# Patient Record
Sex: FEMALE | Race: WHITE | NOT HISPANIC OR LATINO | Employment: UNEMPLOYED | URBAN - METROPOLITAN AREA
[De-identification: names, ages, dates, MRNs, and addresses within clinical notes are randomized per-mention and may not be internally consistent; named-entity substitution may affect disease eponyms.]

---

## 2017-01-18 ENCOUNTER — OFFICE VISIT (OUTPATIENT)
Dept: BARIATRICS/WEIGHT MGMT | Facility: CLINIC | Age: 57
End: 2017-01-18

## 2017-01-18 VITALS
OXYGEN SATURATION: 98 % | TEMPERATURE: 97.8 F | HEIGHT: 63 IN | HEART RATE: 91 BPM | DIASTOLIC BLOOD PRESSURE: 77 MMHG | RESPIRATION RATE: 18 BRPM | WEIGHT: 166 LBS | BODY MASS INDEX: 29.41 KG/M2 | SYSTOLIC BLOOD PRESSURE: 110 MMHG

## 2017-01-18 DIAGNOSIS — E03.9 HYPOTHYROIDISM, UNSPECIFIED TYPE: ICD-10-CM

## 2017-01-18 DIAGNOSIS — I10 ESSENTIAL HYPERTENSION: ICD-10-CM

## 2017-01-18 DIAGNOSIS — R10.13 DYSPEPSIA: ICD-10-CM

## 2017-01-18 DIAGNOSIS — R53.83 FATIGUE, UNSPECIFIED TYPE: Primary | ICD-10-CM

## 2017-01-18 DIAGNOSIS — Z98.84 S/P BARIATRIC SURGERY: ICD-10-CM

## 2017-01-18 PROCEDURE — 99214 OFFICE O/P EST MOD 30 MIN: CPT | Performed by: PHYSICIAN ASSISTANT

## 2017-01-18 NOTE — PROGRESS NOTES
[facility]  [address]  [facility] [address]  [phone]        Patient Name: Kirsten Cruz.  : 1960      Date of Visit: 2017      Reason for Visit:  6 months s/p LSG by GDW on 16    HPI:  Kirsten Cruz is a 56 y.o. female s/p LSG by GDW on 16.  Feeling good.  No issues/concerns.  Getting 90g prot/day - still doing 2 shakes/day and making high protein/meat food choices.  Feeling satisfied.  Taking a MVI.  Labs 10/2016 revealed elevated ferritin but were o/w okay.  Exercising 5 days/week - going to a gym.   Down 60 lbs.      Past Medical History   Diagnosis Date   • Anxiety    • Arthritis      KNEES AND LOWER BACK    • Depression    • Dyspepsia    • GERD (gastroesophageal reflux disease)    • Glucose intolerance (impaired glucose tolerance)    • Hypercholesteremia    • Hypertension    • Hypothyroidism      goiter, s/p partial thyroidectom   • Low back pain    • Morbid obesity    • Peripheral edema    • Vitamin D deficiency    • Wears dentures      UPPER PLATE AND PARTIAL ON BOTTOM   • Wears glasses      Past Surgical History   Procedure Laterality Date   • Laparoscopic tubal ligation     • Dilatation and curettage       for hx abnormal pap   • Thyroid lobectomy  2002     partial, for goiter   • Colonoscopy       screening, negative   • Carpal tunnel release Bilateral    • Gastric sleeve laparoscopic N/A 2016     Procedure: GASTRIC SLEEVE LAPAROSCOPIC;  Surgeon: Jermaine Tomlinson MD;  Location:  [facility];  Service:    • Total abdominal hysterectomy with salpingo oophorectomy N/A 2016     Procedure: TOTAL ABDOMINAL HYSTERECTOMY, BILATERAL SALPINGO OOPHORECTOMY;  Surgeon: Fifi Partida MD;  Location:  [facility];  Service:    • Appendectomy open  1970     Outpatient Prescriptions Marked as Taking for the 17 encounter (Office Visit) with JERI Lua   Medication Sig Dispense Refill   •  "cyclobenzaprine (FLEXERIL) 10 MG tablet Take 10 mg by mouth 2 (two) times a day as needed for muscle spasms.     • escitalopram (LEXAPRO) 10 MG tablet Take 10 mg by mouth daily.     • ibuprofen (ADVIL,MOTRIN) 400 MG tablet Take 1 tablet by mouth every 6 (six) hours as needed for mild pain (1-3). 25 tablet 0   • levothyroxine (SYNTHROID, LEVOTHROID) 75 MCG tablet Take 75 mcg by mouth daily.     • Multiple Vitamins-Minerals (MULTIVITAMIN ADULT PO) Take 1 tablet by mouth daily.     • Omeprazole 20 MG tablet delayed-release takes one a day  0     No Known Allergies     Social History     Social History   • Marital status:      Spouse name: N/A   • Number of children: 2   • Years of education: N/A     Occupational History   • un employed      Social History Main Topics   • Smoking status: Former Smoker     Years: 30.00     Types: Cigarettes   • Smokeless tobacco: Never Used      Comment: QUIT 4 YEARS AGO   • Alcohol use No   • Drug use: No   • Sexual activity: Yes     Partners: Male     Birth control/ protection: Post-menopausal      Comment: POSTMENOPAUSAL     Other Topics Concern   • Not on file     Social History Narrative       Visit Vitals   • /77 (BP Location: Left arm, Patient Position: Sitting, Cuff Size: Large Adult)   • Pulse 91   • Temp 97.8 °F (36.6 °C) (Temporal Artery )   • Resp 18   • Ht 63\" (160 cm)   • Wt 166 lb (75.3 kg)   • LMP  (LMP Unknown)   • SpO2 98%   • BMI 29.41 kg/m2       Physical Exam   Constitutional: She appears well-developed and well-nourished. She is cooperative.   HENT:   Mouth/Throat: Oropharynx is clear and moist and mucous membranes are normal.   Eyes: Conjunctivae are normal. No scleral icterus.   Cardiovascular: Normal rate and regular rhythm.    Pulmonary/Chest: Effort normal and breath sounds normal.   Abdominal: Soft. Bowel sounds are normal. There is no tenderness.   Musculoskeletal: Normal range of motion. She exhibits no edema.   Neurological: She is alert. "   Skin: Skin is warm and dry. No rash noted.   Psychiatric: She has a normal mood and affect. Judgment normal.         Assessment:  6 months s/p LSG by BENJAMÍNW on 7/13/16    ICD-10-CM ICD-9-CM   1. Fatigue, unspecified type R53.83 780.79   2. Essential hypertension I10 401.9   3. Hypothyroidism, unspecified type E03.9 244.9   4. Dyspepsia R10.13 536.8   5. S/P bariatric surgery Z98.84 V45.86       Plan:  Continue w/ good food choices and healthy habits.  Encouraged to focus on high protein, low carb.  Continue routine exercise.  Routine labs ordered.  Call w/ issues/concerns.  RTC sooner w/ problems.       The patient was instructed to follow up in 6 months .     note: approx 15 of the 25 minute visit was spent counseling on dietary/lifestyle modifications

## 2017-01-20 LAB
ALBUMIN SERPL-MCNC: 4.3 G/DL (ref 3.2–4.8)
ALBUMIN/GLOB SERPL: 1.5 G/DL (ref 1.5–2.5)
ALP SERPL-CCNC: 177 U/L (ref 25–100)
ALT SERPL-CCNC: 21 U/L (ref 7–40)
AST SERPL-CCNC: 19 U/L (ref 0–33)
BASOPHILS # BLD AUTO: 0.04 10*3/MM3 (ref 0–0.2)
BASOPHILS NFR BLD AUTO: 0.7 % (ref 0–1)
BILIRUB SERPL-MCNC: 0.4 MG/DL (ref 0.3–1.2)
BUN SERPL-MCNC: 19 MG/DL (ref 9–23)
BUN/CREAT SERPL: 23.8 (ref 7–25)
CALCIUM SERPL-MCNC: 10.2 MG/DL (ref 8.7–10.4)
CHLORIDE SERPL-SCNC: 105 MMOL/L (ref 99–109)
CO2 SERPL-SCNC: 29 MMOL/L (ref 20–31)
CREAT SERPL-MCNC: 0.8 MG/DL (ref 0.6–1.3)
EOSINOPHIL # BLD AUTO: 0.12 10*3/MM3 (ref 0.1–0.3)
EOSINOPHIL NFR BLD AUTO: 2.1 % (ref 0–3)
ERYTHROCYTE [DISTWIDTH] IN BLOOD BY AUTOMATED COUNT: 13.9 % (ref 11.3–14.5)
FERRITIN SERPL-MCNC: 206 NG/ML (ref 10–291)
FOLATE SERPL-MCNC: >24 NG/ML (ref 3.2–20)
GLOBULIN SER CALC-MCNC: 2.9 GM/DL
GLUCOSE SERPL-MCNC: 90 MG/DL (ref 70–100)
HCT VFR BLD AUTO: 43.2 % (ref 34.5–44)
HGB BLD-MCNC: 14 G/DL (ref 11.5–15.5)
IMM GRANULOCYTES # BLD: 0.01 10*3/MM3 (ref 0–0.03)
IMM GRANULOCYTES NFR BLD: 0.2 % (ref 0–0.6)
IRON SERPL-MCNC: 108 MCG/DL (ref 50–175)
LYMPHOCYTES # BLD AUTO: 1.81 10*3/MM3 (ref 0.6–4.8)
LYMPHOCYTES NFR BLD AUTO: 31.2 % (ref 24–44)
MCH RBC QN AUTO: 28.3 PG (ref 27–31)
MCHC RBC AUTO-ENTMCNC: 32.4 G/DL (ref 32–36)
MCV RBC AUTO: 87.4 FL (ref 80–99)
METHYLMALONATE SERPL-SCNC: 174 NMOL/L (ref 0–378)
MONOCYTES # BLD AUTO: 0.57 10*3/MM3 (ref 0–1)
MONOCYTES NFR BLD AUTO: 9.8 % (ref 0–12)
NEUTROPHILS # BLD AUTO: 3.25 10*3/MM3 (ref 1.5–8.3)
NEUTROPHILS NFR BLD AUTO: 56 % (ref 41–71)
PLATELET # BLD AUTO: 272 10*3/MM3 (ref 150–450)
POTASSIUM SERPL-SCNC: 4.4 MMOL/L (ref 3.5–5.5)
PREALB SERPL-MCNC: 22 MG/DL (ref 10–36)
PROT SERPL-MCNC: 7.2 G/DL (ref 5.7–8.2)
RBC # BLD AUTO: 4.94 10*6/MM3 (ref 3.89–5.14)
SODIUM SERPL-SCNC: 143 MMOL/L (ref 132–146)
VIT B1 BLD-SCNC: 109.3 NMOL/L (ref 66.5–200)
WBC # BLD AUTO: 5.8 10*3/MM3 (ref 3.5–10.8)